# Patient Record
Sex: FEMALE | Race: WHITE
[De-identification: names, ages, dates, MRNs, and addresses within clinical notes are randomized per-mention and may not be internally consistent; named-entity substitution may affect disease eponyms.]

---

## 2023-07-31 ENCOUNTER — HOSPITAL ENCOUNTER (OUTPATIENT)
Dept: HOSPITAL 94 - SSTAY O | Age: 78
Discharge: HOME | End: 2023-07-31
Attending: STUDENT IN AN ORGANIZED HEALTH CARE EDUCATION/TRAINING PROGRAM
Payer: MEDICARE

## 2023-07-31 VITALS
OXYGEN SATURATION: 100 % | RESPIRATION RATE: 20 BRPM | SYSTOLIC BLOOD PRESSURE: 137 MMHG | DIASTOLIC BLOOD PRESSURE: 68 MMHG | HEART RATE: 79 BPM

## 2023-07-31 VITALS
DIASTOLIC BLOOD PRESSURE: 65 MMHG | OXYGEN SATURATION: 91 % | HEART RATE: 68 BPM | RESPIRATION RATE: 13 BRPM | SYSTOLIC BLOOD PRESSURE: 132 MMHG

## 2023-07-31 VITALS
RESPIRATION RATE: 14 BRPM | SYSTOLIC BLOOD PRESSURE: 141 MMHG | HEART RATE: 78 BPM | DIASTOLIC BLOOD PRESSURE: 44 MMHG | OXYGEN SATURATION: 92 %

## 2023-07-31 VITALS
TEMPERATURE: 98.6 F | HEART RATE: 103 BPM | RESPIRATION RATE: 16 BRPM | SYSTOLIC BLOOD PRESSURE: 148 MMHG | OXYGEN SATURATION: 99 % | DIASTOLIC BLOOD PRESSURE: 83 MMHG

## 2023-07-31 VITALS
HEART RATE: 83 BPM | DIASTOLIC BLOOD PRESSURE: 73 MMHG | OXYGEN SATURATION: 96 % | SYSTOLIC BLOOD PRESSURE: 136 MMHG | RESPIRATION RATE: 19 BRPM

## 2023-07-31 VITALS
SYSTOLIC BLOOD PRESSURE: 151 MMHG | HEART RATE: 72 BPM | RESPIRATION RATE: 22 BRPM | OXYGEN SATURATION: 100 % | DIASTOLIC BLOOD PRESSURE: 62 MMHG

## 2023-07-31 VITALS
SYSTOLIC BLOOD PRESSURE: 135 MMHG | HEART RATE: 78 BPM | RESPIRATION RATE: 20 BRPM | OXYGEN SATURATION: 100 % | DIASTOLIC BLOOD PRESSURE: 64 MMHG

## 2023-07-31 VITALS
OXYGEN SATURATION: 92 % | SYSTOLIC BLOOD PRESSURE: 104 MMHG | HEART RATE: 86 BPM | DIASTOLIC BLOOD PRESSURE: 72 MMHG | RESPIRATION RATE: 22 BRPM

## 2023-07-31 VITALS
DIASTOLIC BLOOD PRESSURE: 84 MMHG | HEART RATE: 69 BPM | RESPIRATION RATE: 21 BRPM | OXYGEN SATURATION: 94 % | SYSTOLIC BLOOD PRESSURE: 145 MMHG

## 2023-07-31 VITALS — BODY MASS INDEX: 20.29 KG/M2 | WEIGHT: 118.83 LBS | HEIGHT: 64 IN

## 2023-07-31 DIAGNOSIS — Z79.82: ICD-10-CM

## 2023-07-31 DIAGNOSIS — I35.0: Primary | ICD-10-CM

## 2023-07-31 DIAGNOSIS — Z91.041: ICD-10-CM

## 2023-07-31 DIAGNOSIS — E78.5: ICD-10-CM

## 2023-07-31 DIAGNOSIS — I25.10: ICD-10-CM

## 2023-07-31 DIAGNOSIS — Z79.899: ICD-10-CM

## 2023-07-31 DIAGNOSIS — I10: ICD-10-CM

## 2023-07-31 LAB
HCT VFR BLD CALC: 27 %PCV (ref 35–45)
HCT VFR BLD CALC: 27 %PCV (ref 35–45)
SAO2 % BLDV FROM PO2: 67 % (ref 60–80)
SAO2 % BLDV FROM PO2: 93 % (ref 60–80)
SPECIMEN SOURCE: (no result)
SPECIMEN SOURCE: (no result)

## 2023-07-31 PROCEDURE — 93456 R HRT CORONARY ARTERY ANGIO: CPT

## 2023-07-31 PROCEDURE — 85014 HEMATOCRIT: CPT

## 2023-07-31 PROCEDURE — 82803 BLOOD GASES ANY COMBINATION: CPT

## 2023-07-31 PROCEDURE — 93005 ELECTROCARDIOGRAM TRACING: CPT

## 2023-07-31 PROCEDURE — 99153 MOD SED SAME PHYS/QHP EA: CPT

## 2023-07-31 PROCEDURE — 99152 MOD SED SAME PHYS/QHP 5/>YRS: CPT

## 2023-08-25 ENCOUNTER — HOSPITAL ENCOUNTER (OUTPATIENT)
Dept: HOSPITAL 94 - RAD | Age: 78
Discharge: HOME | End: 2023-08-25
Attending: INTERNAL MEDICINE
Payer: MEDICARE

## 2023-08-25 ENCOUNTER — HOSPITAL ENCOUNTER (INPATIENT)
Dept: HOSPITAL 94 - ER | Age: 78
LOS: 2 days | Discharge: HOME | DRG: 812 | End: 2023-08-27
Attending: FAMILY MEDICINE | Admitting: FAMILY MEDICINE
Payer: MEDICARE

## 2023-08-25 VITALS
SYSTOLIC BLOOD PRESSURE: 124 MMHG | DIASTOLIC BLOOD PRESSURE: 55 MMHG | RESPIRATION RATE: 17 BRPM | TEMPERATURE: 98.1 F | HEART RATE: 89 BPM

## 2023-08-25 VITALS
SYSTOLIC BLOOD PRESSURE: 141 MMHG | HEART RATE: 74 BPM | TEMPERATURE: 98.4 F | DIASTOLIC BLOOD PRESSURE: 59 MMHG | RESPIRATION RATE: 16 BRPM

## 2023-08-25 VITALS
HEART RATE: 89 BPM | RESPIRATION RATE: 16 BRPM | DIASTOLIC BLOOD PRESSURE: 72 MMHG | SYSTOLIC BLOOD PRESSURE: 151 MMHG | TEMPERATURE: 98.3 F

## 2023-08-25 VITALS
TEMPERATURE: 98.4 F | HEART RATE: 73 BPM | SYSTOLIC BLOOD PRESSURE: 150 MMHG | DIASTOLIC BLOOD PRESSURE: 57 MMHG | RESPIRATION RATE: 18 BRPM

## 2023-08-25 VITALS
TEMPERATURE: 98.4 F | RESPIRATION RATE: 18 BRPM | SYSTOLIC BLOOD PRESSURE: 153 MMHG | HEART RATE: 92 BPM | DIASTOLIC BLOOD PRESSURE: 65 MMHG

## 2023-08-25 VITALS
RESPIRATION RATE: 16 BRPM | DIASTOLIC BLOOD PRESSURE: 59 MMHG | HEART RATE: 79 BPM | TEMPERATURE: 98 F | SYSTOLIC BLOOD PRESSURE: 145 MMHG

## 2023-08-25 VITALS — WEIGHT: 116.84 LBS | HEIGHT: 64 IN | BODY MASS INDEX: 19.95 KG/M2

## 2023-08-25 VITALS
TEMPERATURE: 98.4 F | RESPIRATION RATE: 16 BRPM | SYSTOLIC BLOOD PRESSURE: 146 MMHG | HEART RATE: 76 BPM | DIASTOLIC BLOOD PRESSURE: 93 MMHG

## 2023-08-25 DIAGNOSIS — I65.29: ICD-10-CM

## 2023-08-25 DIAGNOSIS — D50.9: Primary | ICD-10-CM

## 2023-08-25 DIAGNOSIS — R91.1: ICD-10-CM

## 2023-08-25 DIAGNOSIS — I35.0: ICD-10-CM

## 2023-08-25 DIAGNOSIS — I10: ICD-10-CM

## 2023-08-25 DIAGNOSIS — Z98.51: ICD-10-CM

## 2023-08-25 DIAGNOSIS — Z79.899: ICD-10-CM

## 2023-08-25 DIAGNOSIS — R91.8: ICD-10-CM

## 2023-08-25 DIAGNOSIS — N85.9: ICD-10-CM

## 2023-08-25 DIAGNOSIS — R19.5: ICD-10-CM

## 2023-08-25 DIAGNOSIS — E78.5: ICD-10-CM

## 2023-08-25 DIAGNOSIS — I71.43: Primary | ICD-10-CM

## 2023-08-25 DIAGNOSIS — R06.02: ICD-10-CM

## 2023-08-25 DIAGNOSIS — Z88.8: ICD-10-CM

## 2023-08-25 DIAGNOSIS — I70.0: ICD-10-CM

## 2023-08-25 LAB
ALBUMIN SERPL BCP-MCNC: 3.1 G/DL (ref 3.4–5)
ALBUMIN SERPL BCP-MCNC: 3.3 G/DL (ref 3.4–5)
ALBUMIN/GLOB SERPL: 0.9 {RATIO} (ref 1.1–1.5)
ALBUMIN/GLOB SERPL: 0.9 {RATIO} (ref 1.1–1.5)
ALP SERPL-CCNC: 78 IU/L (ref 46–116)
ALP SERPL-CCNC: 81 IU/L (ref 46–116)
ALT SERPL W P-5'-P-CCNC: 14 U/L (ref 12–78)
ALT SERPL W P-5'-P-CCNC: 9 U/L (ref 12–78)
ANION GAP SERPL CALCULATED.3IONS-SCNC: 8 MMOL/L (ref 8–16)
ANION GAP SERPL CALCULATED.3IONS-SCNC: 9 MMOL/L (ref 8–16)
APTT PPP: 23 SECONDS (ref 22–32)
APTT PPP: 23 SECONDS (ref 22–32)
AST SERPL W P-5'-P-CCNC: 6 U/L (ref 10–37)
AST SERPL W P-5'-P-CCNC: 8 U/L (ref 10–37)
BASOPHILS # BLD AUTO: 0 X10'3 (ref 0–0.2)
BASOPHILS # BLD AUTO: 0 X10'3 (ref 0–0.2)
BASOPHILS NFR BLD AUTO: 0.1 % (ref 0–1)
BASOPHILS NFR BLD AUTO: 0.1 % (ref 0–1)
BILIRUB SERPL-MCNC: 0.2 MG/DL (ref 0.1–1)
BILIRUB SERPL-MCNC: 0.2 MG/DL (ref 0.1–1)
BILIRUB UR QL STRIP: NEGATIVE
BUN SERPL-MCNC: 18 MG/DL (ref 7–18)
BUN SERPL-MCNC: 20 MG/DL (ref 7–18)
BUN/CREAT SERPL: 27.3 (ref 10–20)
BUN/CREAT SERPL: 29.4 (ref 10–20)
CALCIUM SERPL-MCNC: 9.6 MG/DL (ref 8.5–10.1)
CALCIUM SERPL-MCNC: 9.6 MG/DL (ref 8.5–10.1)
CHLORIDE SERPL-SCNC: 104 MMOL/L (ref 99–107)
CHLORIDE SERPL-SCNC: 107 MMOL/L (ref 99–107)
CLARITY UR: (no result)
CO2 SERPL-SCNC: 25.7 MMOL/L (ref 24–32)
CO2 SERPL-SCNC: 25.9 MMOL/L (ref 24–32)
COLOR UR: YELLOW
CREAT CL PREDICTED SERPL C-G-VRATE: (no result) ML/MIN
CREAT CL PREDICTED SERPL C-G-VRATE: 61 ML/MIN
CREAT SERPL-MCNC: 0.66 MG/DL (ref 0.4–0.9)
CREAT SERPL-MCNC: 0.68 MG/DL (ref 0.4–0.9)
EOSINOPHIL # BLD AUTO: 0 X10'3 (ref 0–0.9)
EOSINOPHIL # BLD AUTO: 0 X10'3 (ref 0–0.9)
EOSINOPHIL NFR BLD AUTO: 0 % (ref 0–6)
EOSINOPHIL NFR BLD AUTO: 0 % (ref 0–6)
ERYTHROCYTE [DISTWIDTH] IN BLOOD BY AUTOMATED COUNT: 16.1 % (ref 11.5–14.5)
ERYTHROCYTE [DISTWIDTH] IN BLOOD BY AUTOMATED COUNT: 16.3 % (ref 11.5–14.5)
GFR SERPL CREATININE-BSD FRML MDRD: 84 ML/MIN
GFR SERPL CREATININE-BSD FRML MDRD: 87 ML/MIN
GLOBULIN SER CALC-MCNC: 3.4 G/DL (ref 2.7–4.3)
GLOBULIN SER CALC-MCNC: 3.6 G/DL (ref 2.7–4.3)
GLUCOSE SERPL-MCNC: 138 MG/DL (ref 70–104)
GLUCOSE SERPL-MCNC: 181 MG/DL (ref 70–104)
GLUCOSE UR STRIP-MCNC: NEGATIVE MG/DL
HCT VFR BLD AUTO: 19.9 % (ref 35–45)
HCT VFR BLD AUTO: 21.9 % (ref 35–45)
HEMOCCULT STL QL: NEGATIVE
HGB BLD-MCNC: 6 G/DL (ref 12–16)
HGB BLD-MCNC: 6.5 G/DL (ref 12–16)
HGB UR QL STRIP: (no result)
INR PPP: 1 INR
INR PPP: 1 INR
IRON SATN MFR SERPL: 3 % (ref 11–46)
IRON SATN MFR SERPL: 408 UG/DL (ref 259–388)
IRON SERPL-MCNC: 12 UG/DL (ref 49–151)
KETONES UR STRIP-MCNC: NEGATIVE MG/DL
LEUKOCYTE ESTERASE UR QL STRIP: (no result)
LYMPHOCYTES # BLD AUTO: 0.5 X10'3 (ref 1.1–4.8)
LYMPHOCYTES # BLD AUTO: 0.7 X10'3 (ref 1.1–4.8)
LYMPHOCYTES NFR BLD AUTO: 4.8 % (ref 21–51)
LYMPHOCYTES NFR BLD AUTO: 6.4 % (ref 21–51)
MAGNESIUM SERPL-MCNC: 2.1 MG/DL (ref 1.5–2.4)
MCH RBC QN AUTO: 26.9 PG (ref 27–31)
MCH RBC QN AUTO: 27 PG (ref 27–31)
MCHC RBC AUTO-ENTMCNC: 29.7 G/DL (ref 33–36.5)
MCHC RBC AUTO-ENTMCNC: 30.1 G/DL (ref 33–36.5)
MCV RBC AUTO: 89.7 FL (ref 78–98)
MCV RBC AUTO: 90.6 FL (ref 78–98)
MONOCYTES # BLD AUTO: 0.2 X10'3 (ref 0–0.9)
MONOCYTES # BLD AUTO: 0.3 X10'3 (ref 0–0.9)
MONOCYTES NFR BLD AUTO: 1.9 % (ref 2–12)
MONOCYTES NFR BLD AUTO: 2.7 % (ref 2–12)
NEUTROPHILS # BLD AUTO: 9.1 X10'3 (ref 1.8–7.7)
NEUTROPHILS # BLD AUTO: 9.7 X10'3 (ref 1.8–7.7)
NEUTROPHILS NFR BLD AUTO: 91.6 % (ref 42–75)
NEUTROPHILS NFR BLD AUTO: 92.4 % (ref 42–75)
NITRITE UR QL STRIP: NEGATIVE
NT-PROBNP SERPL-MCNC: 703 PG/ML (ref 0–450)
PH UR STRIP: 6.5 [PH] (ref 4.8–8)
PLATELET # BLD AUTO: 275 X10'3 (ref 140–440)
PLATELET # BLD AUTO: 300 X10'3 (ref 140–440)
PMV BLD AUTO: 8.6 FL (ref 7.4–10.4)
PMV BLD AUTO: 8.7 FL (ref 7.4–10.4)
POTASSIUM SERPL-SCNC: 4.1 MMOL/L (ref 3.5–5.1)
POTASSIUM SERPL-SCNC: 4.3 MMOL/L (ref 3.5–5.1)
PROT SERPL-MCNC: 6.5 G/DL (ref 6.4–8.2)
PROT SERPL-MCNC: 6.9 G/DL (ref 6.4–8.2)
PROT UR QL STRIP: NEGATIVE MG/DL
PROTHROMBIN TIME: 10.7 SECONDS (ref 9–12)
PROTHROMBIN TIME: 10.9 SECONDS (ref 9–12)
RBC # BLD AUTO: 2.22 X10'6 (ref 4.2–5.6)
RBC # BLD AUTO: 2.42 X10'6 (ref 4.2–5.6)
RETICS #: (no result) /CUMM (ref 23000–93000)
RETICS/RBC NFR AUTO: 8.1 % (ref 0.5–1.5)
SODIUM SERPL-SCNC: 139 MMOL/L (ref 135–145)
SODIUM SERPL-SCNC: 141 MMOL/L (ref 135–145)
SP GR UR STRIP: 1.01 (ref 1–1.03)
UROBILINOGEN UR STRIP-MCNC: 0.2 E.U/DL (ref 0.2–1)
WBC # BLD AUTO: 10.6 X10'3 (ref 4.5–11)
WBC # BLD AUTO: 9.8 X10'3 (ref 4.5–11)

## 2023-08-25 PROCEDURE — 86885 COOMBS TEST INDIRECT QUAL: CPT

## 2023-08-25 PROCEDURE — 82607 VITAMIN B-12: CPT

## 2023-08-25 PROCEDURE — 87088 URINE BACTERIA CULTURE: CPT

## 2023-08-25 PROCEDURE — 97116 GAIT TRAINING THERAPY: CPT

## 2023-08-25 PROCEDURE — 71046 X-RAY EXAM CHEST 2 VIEWS: CPT

## 2023-08-25 PROCEDURE — 83540 ASSAY OF IRON: CPT

## 2023-08-25 PROCEDURE — 71275 CT ANGIOGRAPHY CHEST: CPT

## 2023-08-25 PROCEDURE — 86920 COMPATIBILITY TEST SPIN: CPT

## 2023-08-25 PROCEDURE — 82272 OCCULT BLD FECES 1-3 TESTS: CPT

## 2023-08-25 PROCEDURE — 85610 PROTHROMBIN TIME: CPT

## 2023-08-25 PROCEDURE — 36415 COLL VENOUS BLD VENIPUNCTURE: CPT

## 2023-08-25 PROCEDURE — 83880 ASSAY OF NATRIURETIC PEPTIDE: CPT

## 2023-08-25 PROCEDURE — 83550 IRON BINDING TEST: CPT

## 2023-08-25 PROCEDURE — 85730 THROMBOPLASTIN TIME PARTIAL: CPT

## 2023-08-25 PROCEDURE — 85045 AUTOMATED RETICULOCYTE COUNT: CPT

## 2023-08-25 PROCEDURE — 83735 ASSAY OF MAGNESIUM: CPT

## 2023-08-25 PROCEDURE — 74174 CTA ABD&PLVS W/CONTRAST: CPT

## 2023-08-25 PROCEDURE — 80053 COMPREHEN METABOLIC PANEL: CPT

## 2023-08-25 PROCEDURE — 30233N1 TRANSFUSION OF NONAUTOLOGOUS RED BLOOD CELLS INTO PERIPHERAL VEIN, PERCUTANEOUS APPROACH: ICD-10-PCS | Performed by: FAMILY MEDICINE

## 2023-08-25 PROCEDURE — 86900 BLOOD TYPING SEROLOGIC ABO: CPT

## 2023-08-25 PROCEDURE — 85025 COMPLETE CBC W/AUTO DIFF WBC: CPT

## 2023-08-25 PROCEDURE — 97530 THERAPEUTIC ACTIVITIES: CPT

## 2023-08-25 PROCEDURE — 99291 CRITICAL CARE FIRST HOUR: CPT

## 2023-08-25 PROCEDURE — 75572 CT HRT W/3D IMAGE: CPT

## 2023-08-25 PROCEDURE — 81001 URINALYSIS AUTO W/SCOPE: CPT

## 2023-08-25 PROCEDURE — 81003 URINALYSIS AUTO W/O SCOPE: CPT

## 2023-08-25 PROCEDURE — 93005 ELECTROCARDIOGRAM TRACING: CPT

## 2023-08-25 PROCEDURE — 36430 TRANSFUSION BLD/BLD COMPNT: CPT

## 2023-08-25 PROCEDURE — 80048 BASIC METABOLIC PNL TOTAL CA: CPT

## 2023-08-25 PROCEDURE — 87081 CULTURE SCREEN ONLY: CPT

## 2023-08-25 PROCEDURE — 82728 ASSAY OF FERRITIN: CPT

## 2023-08-25 PROCEDURE — 97161 PT EVAL LOW COMPLEX 20 MIN: CPT

## 2023-08-25 PROCEDURE — 86901 BLOOD TYPING SEROLOGIC RH(D): CPT

## 2023-08-25 RX ADMIN — DOCUSATE SODIUM SCH MG: 100 CAPSULE, LIQUID FILLED ORAL at 20:59

## 2023-08-25 NOTE — NUR
Hospitalist resident at bedside. Pt has signed blood conset. DAVEY Shah aware of 
blood transfusion order.

## 2023-08-26 VITALS — OXYGEN SATURATION: 94 % | RESPIRATION RATE: 16 BRPM

## 2023-08-26 VITALS
HEART RATE: 74 BPM | SYSTOLIC BLOOD PRESSURE: 117 MMHG | DIASTOLIC BLOOD PRESSURE: 48 MMHG | RESPIRATION RATE: 16 BRPM | OXYGEN SATURATION: 99 % | TEMPERATURE: 98 F

## 2023-08-26 VITALS
OXYGEN SATURATION: 98 % | RESPIRATION RATE: 16 BRPM | SYSTOLIC BLOOD PRESSURE: 106 MMHG | HEART RATE: 72 BPM | TEMPERATURE: 97.8 F | DIASTOLIC BLOOD PRESSURE: 59 MMHG

## 2023-08-26 VITALS
OXYGEN SATURATION: 99 % | DIASTOLIC BLOOD PRESSURE: 69 MMHG | RESPIRATION RATE: 12 BRPM | HEART RATE: 94 BPM | SYSTOLIC BLOOD PRESSURE: 165 MMHG | TEMPERATURE: 97.9 F

## 2023-08-26 VITALS
HEART RATE: 67 BPM | TEMPERATURE: 98.2 F | OXYGEN SATURATION: 99 % | RESPIRATION RATE: 16 BRPM | SYSTOLIC BLOOD PRESSURE: 158 MMHG | DIASTOLIC BLOOD PRESSURE: 51 MMHG

## 2023-08-26 LAB
ALBUMIN SERPL BCP-MCNC: 3.2 G/DL (ref 3.4–5)
ANION GAP SERPL CALCULATED.3IONS-SCNC: 7 MMOL/L (ref 8–16)
BACTERIA URNS QL MICRO: (no result) /HPF
BASOPHILS # BLD AUTO: 0 X10'3 (ref 0–0.2)
BASOPHILS NFR BLD AUTO: 0.4 % (ref 0–1)
BUN SERPL-MCNC: 18 MG/DL (ref 7–18)
BUN/CREAT SERPL: 33.3 (ref 10–20)
CALCIUM SERPL-MCNC: 9.4 MG/DL (ref 8.5–10.1)
CHLORIDE SERPL-SCNC: 105 MMOL/L (ref 99–107)
CO2 SERPL-SCNC: 28.4 MMOL/L (ref 24–32)
CREAT CL PREDICTED SERPL C-G-VRATE: 74 ML/MIN
CREAT SERPL-MCNC: 0.54 MG/DL (ref 0.4–0.9)
DEPRECATED SQUAMOUS URNS QL MICRO: (no result) /LPF
EOSINOPHIL # BLD AUTO: 0 X10'3 (ref 0–0.9)
EOSINOPHIL NFR BLD AUTO: 0.2 % (ref 0–6)
ERYTHROCYTE [DISTWIDTH] IN BLOOD BY AUTOMATED COUNT: 16.1 % (ref 11.5–14.5)
GFR SERPL CREATININE-BSD FRML MDRD: > 90 ML/MIN
GLUCOSE SERPL-MCNC: 99 MG/DL (ref 70–104)
HCT VFR BLD AUTO: 29.2 % (ref 35–45)
HGB BLD-MCNC: 9.3 G/DL (ref 12–16)
LYMPHOCYTES # BLD AUTO: 1.7 X10'3 (ref 1.1–4.8)
LYMPHOCYTES NFR BLD AUTO: 16.1 % (ref 21–51)
MCH RBC QN AUTO: 29.2 PG (ref 27–31)
MCHC RBC AUTO-ENTMCNC: 31.7 G/DL (ref 33–36.5)
MCV RBC AUTO: 91.9 FL (ref 78–98)
MONOCYTES # BLD AUTO: 1.1 X10'3 (ref 0–0.9)
MONOCYTES NFR BLD AUTO: 10.6 % (ref 2–12)
NEUTROPHILS # BLD AUTO: 7.6 X10'3 (ref 1.8–7.7)
NEUTROPHILS NFR BLD AUTO: 72.7 % (ref 42–75)
PLATELET # BLD AUTO: 277 X10'3 (ref 140–440)
PMV BLD AUTO: 8.6 FL (ref 7.4–10.4)
POTASSIUM SERPL-SCNC: 3.8 MMOL/L (ref 3.5–5.1)
RBC # BLD AUTO: 3.18 X10'6 (ref 4.2–5.6)
RBC #/AREA URNS HPF: (no result) /HPF (ref 0–2)
SODIUM SERPL-SCNC: 140 MMOL/L (ref 135–145)
URN COLLECT METHOD CLASS: (no result)
WBC # BLD AUTO: 10.5 X10'3 (ref 4.5–11)
WBC #/AREA URNS HPF: (no result) /HPF (ref 0–4)

## 2023-08-26 RX ADMIN — DOCUSATE SODIUM SCH MG: 100 CAPSULE, LIQUID FILLED ORAL at 08:11

## 2023-08-26 RX ADMIN — DOCUSATE SODIUM SCH MG: 100 CAPSULE, LIQUID FILLED ORAL at 19:55

## 2023-08-26 RX ADMIN — MORPHINE SULFATE SCH MG: 15 TABLET, EXTENDED RELEASE ORAL at 08:11

## 2023-08-26 RX ADMIN — MORPHINE SULFATE SCH MG: 15 TABLET, EXTENDED RELEASE ORAL at 19:55

## 2023-08-26 NOTE — NUR
Patient in room [facility] 4011. I have received report from DAVEY Pena and had the opportunity to 
ask questions and assume patient care.

## 2023-08-27 VITALS
HEART RATE: 69 BPM | OXYGEN SATURATION: 99 % | SYSTOLIC BLOOD PRESSURE: 145 MMHG | DIASTOLIC BLOOD PRESSURE: 65 MMHG | RESPIRATION RATE: 14 BRPM | TEMPERATURE: 98.2 F

## 2023-08-27 VITALS — OXYGEN SATURATION: 97 % | RESPIRATION RATE: 16 BRPM

## 2023-08-27 VITALS
TEMPERATURE: 97.4 F | RESPIRATION RATE: 18 BRPM | OXYGEN SATURATION: 99 % | HEART RATE: 64 BPM | DIASTOLIC BLOOD PRESSURE: 78 MMHG | SYSTOLIC BLOOD PRESSURE: 156 MMHG

## 2023-08-27 LAB
ALBUMIN SERPL BCP-MCNC: 2.8 G/DL (ref 3.4–5)
ANION GAP SERPL CALCULATED.3IONS-SCNC: 5 MMOL/L (ref 8–16)
BASOPHILS # BLD AUTO: 0 X10'3 (ref 0–0.2)
BASOPHILS NFR BLD AUTO: 0.4 % (ref 0–1)
BUN SERPL-MCNC: 14 MG/DL (ref 7–18)
BUN/CREAT SERPL: 27.5 (ref 10–20)
CALCIUM SERPL-MCNC: 8.7 MG/DL (ref 8.5–10.1)
CHLORIDE SERPL-SCNC: 105 MMOL/L (ref 99–107)
CO2 SERPL-SCNC: 29.8 MMOL/L (ref 24–32)
CREAT CL PREDICTED SERPL C-G-VRATE: 79 ML/MIN
CREAT SERPL-MCNC: 0.51 MG/DL (ref 0.4–0.9)
EOSINOPHIL # BLD AUTO: 0.1 X10'3 (ref 0–0.9)
EOSINOPHIL NFR BLD AUTO: 2.2 % (ref 0–6)
ERYTHROCYTE [DISTWIDTH] IN BLOOD BY AUTOMATED COUNT: 16.3 % (ref 11.5–14.5)
GFR SERPL CREATININE-BSD FRML MDRD: > 90 ML/MIN
GLUCOSE SERPL-MCNC: 92 MG/DL (ref 70–104)
HCT VFR BLD AUTO: 28.8 % (ref 35–45)
HGB BLD-MCNC: 9.1 G/DL (ref 12–16)
LYMPHOCYTES # BLD AUTO: 1.3 X10'3 (ref 1.1–4.8)
LYMPHOCYTES NFR BLD AUTO: 21.2 % (ref 21–51)
MCH RBC QN AUTO: 28.8 PG (ref 27–31)
MCHC RBC AUTO-ENTMCNC: 31.5 G/DL (ref 33–36.5)
MCV RBC AUTO: 91.6 FL (ref 78–98)
MONOCYTES # BLD AUTO: 0.7 X10'3 (ref 0–0.9)
MONOCYTES NFR BLD AUTO: 11.7 % (ref 2–12)
NEUTROPHILS # BLD AUTO: 4.1 X10'3 (ref 1.8–7.7)
NEUTROPHILS NFR BLD AUTO: 64.5 % (ref 42–75)
PLATELET # BLD AUTO: 240 X10'3 (ref 140–440)
PMV BLD AUTO: 8.9 FL (ref 7.4–10.4)
POTASSIUM SERPL-SCNC: 4.1 MMOL/L (ref 3.5–5.1)
RBC # BLD AUTO: 3.15 X10'6 (ref 4.2–5.6)
SODIUM SERPL-SCNC: 140 MMOL/L (ref 135–145)
WBC # BLD AUTO: 6.4 X10'3 (ref 4.5–11)

## 2023-08-27 RX ADMIN — DOCUSATE SODIUM SCH MG: 100 CAPSULE, LIQUID FILLED ORAL at 07:16

## 2023-08-27 RX ADMIN — MORPHINE SULFATE SCH MG: 15 TABLET, EXTENDED RELEASE ORAL at 07:16

## 2023-08-27 NOTE — NUR
Problems reprioritized. Patient report given, questions answered & plan of care reviewed 
with DAVEY Pena.

## 2023-08-30 ENCOUNTER — HOSPITAL ENCOUNTER (OUTPATIENT)
Dept: HOSPITAL 94 - RT | Age: 78
Discharge: HOME | End: 2023-08-30
Attending: INTERNAL MEDICINE
Payer: MEDICARE

## 2023-08-30 DIAGNOSIS — I65.29: ICD-10-CM

## 2023-08-30 DIAGNOSIS — I35.0: ICD-10-CM

## 2023-08-30 DIAGNOSIS — R94.2: Primary | ICD-10-CM

## 2023-08-30 DIAGNOSIS — R06.02: ICD-10-CM

## 2023-08-30 LAB
BASE EXCESS BLDA CALC-SCNC: -1.1 MMOL/L (ref -2–2)
BODY TEMPERATURE: 37
COHGB MFR BLDA: 6.5 % (ref 0–3.9)
DO-HGB MFR.DF BLDA: 2.5 % (ref 0–5)
GAS FLOW.O2 O2 DELIVERY SYS: (no result) L/MIN
HCO3 BLDA-SCNC: 22.4 MMOL/L (ref 22–26)
HGB BLDA-MCNC: 11.2 G/DL (ref 12–16)
METHGB MFR BLDA: 0.3 % (ref 0–1.5)
O2/TOTAL GAS SETTING VFR VENT: 21 MMHG/%
OXYHGB MFR BLDA: 90.7 % (ref 94–97)
PCO2 TEMP ADJ BLDA: 33.4 MMHG (ref 32–45)
PH TEMP ADJ BLDA: 7.45 [PH] (ref 7.35–7.45)
PO2 TEMP ADJ BLD: 87.8 MMHG (ref 75–100)
SAO2 % BLDA: 97.3 % (ref 94–97)

## 2023-08-30 PROCEDURE — 94727 GAS DIL/WSHOT DETER LNG VOL: CPT

## 2023-08-30 PROCEDURE — 85018 HEMOGLOBIN: CPT

## 2023-08-30 PROCEDURE — 94729 DIFFUSING CAPACITY: CPT

## 2023-08-30 PROCEDURE — 36600 WITHDRAWAL OF ARTERIAL BLOOD: CPT

## 2023-08-30 PROCEDURE — 94010 BREATHING CAPACITY TEST: CPT

## 2023-08-30 PROCEDURE — 82803 BLOOD GASES ANY COMBINATION: CPT

## 2023-09-07 ENCOUNTER — HOSPITAL ENCOUNTER (OUTPATIENT)
Dept: HOSPITAL 94 - TAVR | Age: 78
Discharge: HOME | End: 2023-09-07
Attending: INTERNAL MEDICINE
Payer: MEDICARE

## 2023-09-07 VITALS
OXYGEN SATURATION: 100 % | RESPIRATION RATE: 20 BRPM | DIASTOLIC BLOOD PRESSURE: 71 MMHG | SYSTOLIC BLOOD PRESSURE: 158 MMHG | TEMPERATURE: 98.4 F | HEART RATE: 104 BPM

## 2023-09-07 VITALS — WEIGHT: 115.3 LBS | BODY MASS INDEX: 21.22 KG/M2 | HEIGHT: 62 IN

## 2023-09-07 DIAGNOSIS — Z53.9: Primary | ICD-10-CM

## 2023-09-07 DIAGNOSIS — I35.0: ICD-10-CM

## 2023-09-07 DIAGNOSIS — Z87.42: ICD-10-CM

## 2023-09-07 PROCEDURE — 82272 OCCULT BLD FECES 1-3 TESTS: CPT

## 2023-09-07 NOTE — NUR
Patient and Sister Mercy were in the [facility] clinic today to consult with Dr. Taylor, Dr. BARB Mireles and Dr. Franklin. [facility] completed. Vital signs measured. Patient 
education reviewed and questions answered.